# Patient Record
Sex: FEMALE | Race: ASIAN
[De-identification: names, ages, dates, MRNs, and addresses within clinical notes are randomized per-mention and may not be internally consistent; named-entity substitution may affect disease eponyms.]

---

## 2023-04-12 ENCOUNTER — NON-APPOINTMENT (OUTPATIENT)
Age: 38
End: 2023-04-12

## 2023-04-12 ENCOUNTER — APPOINTMENT (OUTPATIENT)
Dept: PULMONOLOGY | Facility: CLINIC | Age: 38
End: 2023-04-12
Payer: COMMERCIAL

## 2023-04-12 VITALS
SYSTOLIC BLOOD PRESSURE: 114 MMHG | DIASTOLIC BLOOD PRESSURE: 84 MMHG | WEIGHT: 162 LBS | BODY MASS INDEX: 29.81 KG/M2 | RESPIRATION RATE: 16 BRPM | HEART RATE: 92 BPM | HEIGHT: 62 IN | TEMPERATURE: 97.7 F | OXYGEN SATURATION: 98 %

## 2023-04-12 DIAGNOSIS — U07.1 COVID-19: ICD-10-CM

## 2023-04-12 DIAGNOSIS — Z82.49 FAMILY HISTORY OF ISCHEMIC HEART DISEASE AND OTHER DISEASES OF THE CIRCULATORY SYSTEM: ICD-10-CM

## 2023-04-12 DIAGNOSIS — Z78.9 OTHER SPECIFIED HEALTH STATUS: ICD-10-CM

## 2023-04-12 DIAGNOSIS — R06.83 SNORING: ICD-10-CM

## 2023-04-12 DIAGNOSIS — Z83.438 FAMILY HISTORY OF OTHER DISORDER OF LIPOPROTEIN METABOLISM AND OTHER LIPIDEMIA: ICD-10-CM

## 2023-04-12 DIAGNOSIS — Z87.09 PERSONAL HISTORY OF OTHER DISEASES OF THE RESPIRATORY SYSTEM: ICD-10-CM

## 2023-04-12 DIAGNOSIS — Z82.3 FAMILY HISTORY OF STROKE: ICD-10-CM

## 2023-04-12 PROBLEM — Z00.00 ENCOUNTER FOR PREVENTIVE HEALTH EXAMINATION: Status: ACTIVE | Noted: 2023-04-12

## 2023-04-12 PROCEDURE — 99203 OFFICE O/P NEW LOW 30 MIN: CPT

## 2023-04-12 RX ORDER — FEXOFENADINE HCL 60 MG
TABLET ORAL DAILY
Refills: 0 | Status: ACTIVE | COMMUNITY

## 2023-04-12 NOTE — ASSESSMENT
[FreeTextEntry1] : 37-year-old woman with history of snoring and sleep disturbance will rule out sleep apnea.\par \par We will do a watch Pat study to assess the degree of sleep apnea.

## 2023-04-12 NOTE — HISTORY OF PRESENT ILLNESS
[FreeTextEntry1] : Dr. Sweeney\par Dr. Kiser\par 37 year old woman  with history of covid (took months to recover)  is here in the sleep center to address excessive snoring and restless sleep.  Patient is sleepy with [place] sleepiness score of 12 .  Patient has very loud snoring, does not have any witnessed apneas.  Patient's bedtime is around 10 PM wakes up in the morning around 5 AM.  she  feels rested when she  wakes up.  Patient drinks 1 cup of coffee during the daytime. Patient does not have any headaches or nocturia.  she  is not sleepy while driving.\par STOPBANG score - 2\par She had significant pulmonary issues with covid which resolved after few months.\par

## 2023-05-08 ENCOUNTER — NON-APPOINTMENT (OUTPATIENT)
Age: 38
End: 2023-05-08

## 2023-05-16 ENCOUNTER — TRANSCRIPTION ENCOUNTER (OUTPATIENT)
Age: 38
End: 2023-05-16

## 2024-12-12 ENCOUNTER — TRANSCRIPTION ENCOUNTER (OUTPATIENT)
Age: 39
End: 2024-12-12

## 2024-12-14 ENCOUNTER — TRANSCRIPTION ENCOUNTER (OUTPATIENT)
Age: 39
End: 2024-12-14